# Patient Record
Sex: FEMALE | Race: WHITE | Employment: FULL TIME | URBAN - METROPOLITAN AREA
[De-identification: names, ages, dates, MRNs, and addresses within clinical notes are randomized per-mention and may not be internally consistent; named-entity substitution may affect disease eponyms.]

---

## 2017-02-17 ENCOUNTER — OFFICE VISIT (OUTPATIENT)
Dept: FAMILY MEDICINE CLINIC | Age: 47
End: 2017-02-17

## 2017-02-17 VITALS
SYSTOLIC BLOOD PRESSURE: 126 MMHG | TEMPERATURE: 98.2 F | BODY MASS INDEX: 38.09 KG/M2 | WEIGHT: 237 LBS | HEART RATE: 96 BPM | DIASTOLIC BLOOD PRESSURE: 70 MMHG | OXYGEN SATURATION: 98 % | HEIGHT: 66 IN

## 2017-02-17 DIAGNOSIS — R05.9 COUGH: ICD-10-CM

## 2017-02-17 DIAGNOSIS — J40 BRONCHITIS: Primary | ICD-10-CM

## 2017-02-17 DIAGNOSIS — J45.21 MILD INTERMITTENT ASTHMA WITH ACUTE EXACERBATION: ICD-10-CM

## 2017-02-17 DIAGNOSIS — M54.2 CERVICALGIA: ICD-10-CM

## 2017-02-17 DIAGNOSIS — F32.0 MILD SINGLE CURRENT EPISODE OF MAJOR DEPRESSIVE DISORDER (HCC): ICD-10-CM

## 2017-02-17 LAB
INFLUENZA A ANTIBODY: NORMAL
INFLUENZA B ANTIBODY: NORMAL

## 2017-02-17 PROCEDURE — 87804 INFLUENZA ASSAY W/OPTIC: CPT | Performed by: INTERNAL MEDICINE

## 2017-02-17 PROCEDURE — 99213 OFFICE O/P EST LOW 20 MIN: CPT | Performed by: INTERNAL MEDICINE

## 2017-02-17 RX ORDER — ESCITALOPRAM OXALATE 10 MG/1
10 TABLET ORAL NIGHTLY
Qty: 30 TABLET | Refills: 2 | Status: SHIPPED | OUTPATIENT
Start: 2017-02-17 | End: 2018-06-01

## 2017-02-17 RX ORDER — OSELTAMIVIR PHOSPHATE 75 MG/1
75 CAPSULE ORAL 2 TIMES DAILY
Qty: 10 CAPSULE | Refills: 0 | Status: SHIPPED | OUTPATIENT
Start: 2017-02-17 | End: 2017-02-22

## 2017-02-17 RX ORDER — ALBUTEROL SULFATE 90 UG/1
2 AEROSOL, METERED RESPIRATORY (INHALATION) EVERY 6 HOURS PRN
Qty: 1 INHALER | Refills: 3 | Status: SHIPPED | OUTPATIENT
Start: 2017-02-17

## 2017-02-17 RX ORDER — PREDNISONE 20 MG/1
TABLET ORAL
Qty: 10 TABLET | Refills: 0 | Status: SHIPPED | OUTPATIENT
Start: 2017-02-17 | End: 2018-05-21 | Stop reason: ALTCHOICE

## 2017-02-17 ASSESSMENT — ENCOUNTER SYMPTOMS
DIARRHEA: 0
SHORTNESS OF BREATH: 0
NAUSEA: 0
EYE ITCHING: 0
EYE PAIN: 0
CHEST TIGHTNESS: 0
SORE THROAT: 0
SINUS PRESSURE: 0
RHINORRHEA: 0
WHEEZING: 0
VOMITING: 0
CONSTIPATION: 0
EYE REDNESS: 0
COUGH: 1

## 2017-02-20 ENCOUNTER — TELEPHONE (OUTPATIENT)
Dept: FAMILY MEDICINE CLINIC | Age: 47
End: 2017-02-20

## 2017-02-20 DIAGNOSIS — J45.21 MILD INTERMITTENT ASTHMA WITH ACUTE EXACERBATION: Primary | ICD-10-CM

## 2017-02-20 RX ORDER — ALBUTEROL SULFATE 0.63 MG/3ML
1 SOLUTION RESPIRATORY (INHALATION) EVERY 6 HOURS PRN
Qty: 270 ML | Refills: 3 | Status: SHIPPED | OUTPATIENT
Start: 2017-02-20

## 2018-05-21 ENCOUNTER — OFFICE VISIT (OUTPATIENT)
Dept: INTERNAL MEDICINE CLINIC | Age: 48
End: 2018-05-21

## 2018-05-21 VITALS
WEIGHT: 242.4 LBS | SYSTOLIC BLOOD PRESSURE: 124 MMHG | DIASTOLIC BLOOD PRESSURE: 84 MMHG | BODY MASS INDEX: 40.39 KG/M2 | HEART RATE: 80 BPM | HEIGHT: 65 IN | OXYGEN SATURATION: 98 %

## 2018-05-21 DIAGNOSIS — F33.9 RECURRENT MAJOR DEPRESSIVE DISORDER, REMISSION STATUS UNSPECIFIED (HCC): ICD-10-CM

## 2018-05-21 DIAGNOSIS — D17.0 LIPOMA OF NECK: ICD-10-CM

## 2018-05-21 DIAGNOSIS — G56.01 CARPAL TUNNEL SYNDROME OF RIGHT WRIST: ICD-10-CM

## 2018-05-21 DIAGNOSIS — Z13.31 POSITIVE DEPRESSION SCREENING: ICD-10-CM

## 2018-05-21 DIAGNOSIS — Z00.00 PREVENTATIVE HEALTH CARE: ICD-10-CM

## 2018-05-21 DIAGNOSIS — M25.561 RIGHT ANTERIOR KNEE PAIN: ICD-10-CM

## 2018-05-21 DIAGNOSIS — Z00.00 PREVENTATIVE HEALTH CARE: Primary | ICD-10-CM

## 2018-05-21 LAB
A/G RATIO: 1.5 (ref 1.1–2.2)
ALBUMIN SERPL-MCNC: 4.4 G/DL (ref 3.4–5)
ALP BLD-CCNC: 61 U/L (ref 40–129)
ALT SERPL-CCNC: 22 U/L (ref 10–40)
ANION GAP SERPL CALCULATED.3IONS-SCNC: 14 MMOL/L (ref 3–16)
AST SERPL-CCNC: 17 U/L (ref 15–37)
BASOPHILS ABSOLUTE: 0 K/UL (ref 0–0.2)
BASOPHILS RELATIVE PERCENT: 0.9 %
BILIRUB SERPL-MCNC: 0.5 MG/DL (ref 0–1)
BUN BLDV-MCNC: 14 MG/DL (ref 7–20)
CALCIUM SERPL-MCNC: 9.1 MG/DL (ref 8.3–10.6)
CHLORIDE BLD-SCNC: 98 MMOL/L (ref 99–110)
CHOLESTEROL, TOTAL: 178 MG/DL (ref 0–199)
CO2: 27 MMOL/L (ref 21–32)
CREAT SERPL-MCNC: 0.6 MG/DL (ref 0.6–1.1)
EOSINOPHILS ABSOLUTE: 0.2 K/UL (ref 0–0.6)
EOSINOPHILS RELATIVE PERCENT: 4 %
GFR AFRICAN AMERICAN: >60
GFR NON-AFRICAN AMERICAN: >60
GLOBULIN: 2.9 G/DL
GLUCOSE BLD-MCNC: 98 MG/DL (ref 70–99)
HCT VFR BLD CALC: 39 % (ref 36–48)
HDLC SERPL-MCNC: 48 MG/DL (ref 40–60)
HEMOGLOBIN: 13.6 G/DL (ref 12–16)
LDL CHOLESTEROL CALCULATED: 105 MG/DL
LYMPHOCYTES ABSOLUTE: 1 K/UL (ref 1–5.1)
LYMPHOCYTES RELATIVE PERCENT: 22.4 %
MCH RBC QN AUTO: 31.8 PG (ref 26–34)
MCHC RBC AUTO-ENTMCNC: 34.9 G/DL (ref 31–36)
MCV RBC AUTO: 91.1 FL (ref 80–100)
MONOCYTES ABSOLUTE: 0.5 K/UL (ref 0–1.3)
MONOCYTES RELATIVE PERCENT: 11.7 %
NEUTROPHILS ABSOLUTE: 2.8 K/UL (ref 1.7–7.7)
NEUTROPHILS RELATIVE PERCENT: 61 %
PDW BLD-RTO: 13.9 % (ref 12.4–15.4)
PLATELET # BLD: 208 K/UL (ref 135–450)
PMV BLD AUTO: 9.3 FL (ref 5–10.5)
POTASSIUM SERPL-SCNC: 4.4 MMOL/L (ref 3.5–5.1)
RBC # BLD: 4.28 M/UL (ref 4–5.2)
SODIUM BLD-SCNC: 139 MMOL/L (ref 136–145)
TOTAL PROTEIN: 7.3 G/DL (ref 6.4–8.2)
TRIGL SERPL-MCNC: 127 MG/DL (ref 0–150)
TSH SERPL DL<=0.05 MIU/L-ACNC: 1.68 UIU/ML (ref 0.27–4.2)
VLDLC SERPL CALC-MCNC: 25 MG/DL
WBC # BLD: 4.6 K/UL (ref 4–11)

## 2018-05-21 PROCEDURE — G8431 POS CLIN DEPRES SCRN F/U DOC: HCPCS | Performed by: NURSE PRACTITIONER

## 2018-05-21 PROCEDURE — 99214 OFFICE O/P EST MOD 30 MIN: CPT | Performed by: NURSE PRACTITIONER

## 2018-05-21 PROCEDURE — G0444 DEPRESSION SCREEN ANNUAL: HCPCS | Performed by: NURSE PRACTITIONER

## 2018-05-21 RX ORDER — DULOXETIN HYDROCHLORIDE 60 MG/1
CAPSULE, DELAYED RELEASE ORAL
Qty: 180 CAPSULE | Refills: 0 | Status: SHIPPED | OUTPATIENT
Start: 2018-05-21 | End: 2018-06-19 | Stop reason: SDUPTHER

## 2018-05-21 ASSESSMENT — PATIENT HEALTH QUESTIONNAIRE - PHQ9
SUM OF ALL RESPONSES TO PHQ QUESTIONS 1-9: 7
5. POOR APPETITE OR OVEREATING: 0
7. TROUBLE CONCENTRATING ON THINGS, SUCH AS READING THE NEWSPAPER OR WATCHING TELEVISION: 1
8. MOVING OR SPEAKING SO SLOWLY THAT OTHER PEOPLE COULD HAVE NOTICED. OR THE OPPOSITE, BEING SO FIGETY OR RESTLESS THAT YOU HAVE BEEN MOVING AROUND A LOT MORE THAN USUAL: 0
4. FEELING TIRED OR HAVING LITTLE ENERGY: 3
1. LITTLE INTEREST OR PLEASURE IN DOING THINGS: 3
3. TROUBLE FALLING OR STAYING ASLEEP: 0
9. THOUGHTS THAT YOU WOULD BE BETTER OFF DEAD, OR OF HURTING YOURSELF: 0
6. FEELING BAD ABOUT YOURSELF - OR THAT YOU ARE A FAILURE OR HAVE LET YOURSELF OR YOUR FAMILY DOWN: 0
SUM OF ALL RESPONSES TO PHQ9 QUESTIONS 1 & 2: 3
10. IF YOU CHECKED OFF ANY PROBLEMS, HOW DIFFICULT HAVE THESE PROBLEMS MADE IT FOR YOU TO DO YOUR WORK, TAKE CARE OF THINGS AT HOME, OR GET ALONG WITH OTHER PEOPLE: 1
2. FEELING DOWN, DEPRESSED OR HOPELESS: 0

## 2018-05-22 LAB
ESTIMATED AVERAGE GLUCOSE: 125.5 MG/DL
HBA1C MFR BLD: 6 %

## 2018-05-23 ENCOUNTER — OFFICE VISIT (OUTPATIENT)
Dept: BARIATRICS/WEIGHT MGMT | Age: 48
End: 2018-05-23

## 2018-05-23 VITALS
BODY MASS INDEX: 40.08 KG/M2 | DIASTOLIC BLOOD PRESSURE: 76 MMHG | HEART RATE: 80 BPM | HEIGHT: 65 IN | SYSTOLIC BLOOD PRESSURE: 122 MMHG | WEIGHT: 240.6 LBS

## 2018-05-23 DIAGNOSIS — D17.0 LIPOMA OF NECK: Primary | ICD-10-CM

## 2018-05-23 PROCEDURE — 99204 OFFICE O/P NEW MOD 45 MIN: CPT | Performed by: SURGERY

## 2018-05-23 ASSESSMENT — ENCOUNTER SYMPTOMS
NAUSEA: 0
COUGH: 0
WHEEZING: 0
BLOOD IN STOOL: 0
DIARRHEA: 0
CONSTIPATION: 0
VOMITING: 0
ANAL BLEEDING: 0
TROUBLE SWALLOWING: 0
SORE THROAT: 0
PHOTOPHOBIA: 0
ABDOMINAL PAIN: 0
SHORTNESS OF BREATH: 0
COLOR CHANGE: 0
VOICE CHANGE: 0

## 2018-05-29 ENCOUNTER — TELEPHONE (OUTPATIENT)
Dept: BARIATRICS/WEIGHT MGMT | Age: 48
End: 2018-05-29

## 2018-06-13 ENCOUNTER — OFFICE VISIT (OUTPATIENT)
Dept: BARIATRICS/WEIGHT MGMT | Age: 48
End: 2018-06-13

## 2018-06-13 VITALS
WEIGHT: 240.9 LBS | HEIGHT: 65 IN | BODY MASS INDEX: 40.14 KG/M2 | SYSTOLIC BLOOD PRESSURE: 188 MMHG | DIASTOLIC BLOOD PRESSURE: 98 MMHG | HEART RATE: 95 BPM

## 2018-06-13 DIAGNOSIS — D17.0 LIPOMA OF NECK: Primary | ICD-10-CM

## 2018-06-13 PROCEDURE — 99024 POSTOP FOLLOW-UP VISIT: CPT | Performed by: SURGERY

## 2018-06-13 ASSESSMENT — ENCOUNTER SYMPTOMS
BLOOD IN STOOL: 0
WHEEZING: 0
CONSTIPATION: 0
ANAL BLEEDING: 0
VOICE CHANGE: 0
DIARRHEA: 0
VOMITING: 0
SORE THROAT: 0
SHORTNESS OF BREATH: 0
ABDOMINAL PAIN: 0
COUGH: 0
TROUBLE SWALLOWING: 0
PHOTOPHOBIA: 0
NAUSEA: 0
COLOR CHANGE: 0

## 2018-06-19 DIAGNOSIS — F33.9 RECURRENT MAJOR DEPRESSIVE DISORDER, REMISSION STATUS UNSPECIFIED (HCC): ICD-10-CM

## 2018-06-19 RX ORDER — DULOXETIN HYDROCHLORIDE 60 MG/1
CAPSULE, DELAYED RELEASE ORAL
Qty: 180 CAPSULE | Refills: 0 | Status: SHIPPED | OUTPATIENT
Start: 2018-06-19

## 2018-06-27 RX ORDER — OXYCODONE HYDROCHLORIDE AND ACETAMINOPHEN 5; 325 MG/1; MG/1
1-2 TABLET ORAL EVERY 6 HOURS PRN
Qty: 35 TABLET | Refills: 0 | Status: SHIPPED | OUTPATIENT
Start: 2018-06-27 | End: 2018-07-04

## 2018-07-13 ENCOUNTER — OFFICE VISIT (OUTPATIENT)
Dept: BARIATRICS/WEIGHT MGMT | Age: 48
End: 2018-07-13

## 2018-07-13 VITALS
SYSTOLIC BLOOD PRESSURE: 134 MMHG | HEIGHT: 65 IN | DIASTOLIC BLOOD PRESSURE: 83 MMHG | HEART RATE: 69 BPM | RESPIRATION RATE: 17 BRPM | WEIGHT: 231.4 LBS | BODY MASS INDEX: 38.55 KG/M2

## 2018-07-13 DIAGNOSIS — D17.21 LIPOMA OF RIGHT UPPER EXTREMITY: Primary | ICD-10-CM

## 2018-07-13 PROCEDURE — 99024 POSTOP FOLLOW-UP VISIT: CPT | Performed by: SURGERY

## 2018-07-13 NOTE — PROGRESS NOTES
GENERAL SURGERY OFFICE NOTE    SUBJECTIVE:    Patient presenting today referred from Maxim Miranda, APRN - CNP and No ref. provider found, for   Chief Complaint   Patient presents with    Follow-up     PO#2 Neck Excision       HPI: Josetta Spatz is a 56 y.o. female presenting in second, follow up 1 month post op 6/4/18. Procedure:   Excision of the right neck lipoma with  supraclavicular lymph node dissection. Pathology:   Final Pathologic Diagnosis:  Soft tissue, right neck, excision:  -  Lipoma. -  Focal benign reactive lymph node is noted. WOUND VERY NICELY HEALING, numbness improving, and no infection. Wounds very nicely healing, no erythema, no induration, no purulent discharge. No constipation, BMs back to normal.    Thoroughly reviewed the patient's medical history, family history, social history and review of systems with the patient today in the office. Please see medical record for pertinent positives.       Past Medical History:   Diagnosis Date    Anxiety     [facility]- usually uses once a year    Depression     pt reports did well on cymbalta in the past    History of basal cell cancer     \"removed left arm- 2013    Seasonal allergies     mostly spring and fall sx    Weight gain       Past Surgical History:   Procedure Laterality Date    ARM SURGERY Left 2013    basal cell removal    WISDOM TOOTH EXTRACTION  age 24     Current Outpatient Prescriptions   Medication Sig Dispense Refill    DULoxetine (CYMBALTA) 60 MG extended release capsule TAKE 2 CAPSULES BY MOUTH NIGHTLY 180 capsule 0    albuterol (ACCUNEB) 0.63 MG/3ML nebulizer solution Take 3 mLs by nebulization every 6 hours as needed for Wheezing 270 mL 3    albuterol sulfate HFA (PROAIR HFA) 108 (90 BASE) MCG/ACT inhaler Inhale 2 puffs into the lungs every 6 hours as needed for Wheezing 1 Inhaler 3    Multiple Vitamins-Minerals (THERAPEUTIC MULTIVITAMIN-MINERALS) tablet Take 1 tablet by mouth daily       No current facility-administered medications for this visit. No Known Allergies        Review of Systems      OBJECTIVE:    /83 (Site: Left Arm, Position: Sitting, Cuff Size: Medium Adult)   Pulse 69   Resp 17   Ht 5' 4.5\" (1.638 m)   Wt 231 lb 6.4 oz (105 kg)   LMP 07/12/2018 (Exact Date)   BMI 39.11 kg/m²      Physical Exam      ASSESSMENT & PLAN:    1. Lipoma of right upper extremity           WOUND VERY NICELY HEALING, numbness improving, and no infection. Patient counseled on the risks, benefits, and alternatives of treatment plan at length while in the office today. Patient states an understanding and willingness to proceed with the plan. Follow Up:  Return for PRN - As needed for any new symptom. Scarlett Lopez MD, FACS, FICS. 7/13/18       Patient was seen with total face to face time of 25 minutes. More than 50% of this visit was counseling and education as above in my assessment and plan.

## 2019-04-22 ENCOUNTER — HOSPITAL ENCOUNTER (OUTPATIENT)
Dept: WOMENS IMAGING | Age: 49
Discharge: HOME | End: 2019-04-22
Payer: COMMERCIAL

## 2019-04-22 DIAGNOSIS — Z12.31 ENCOUNTER FOR SCREENING MAMMOGRAM FOR BREAST CANCER: ICD-10-CM

## 2019-04-22 PROCEDURE — 77067 SCR MAMMO BI INCL CAD: CPT

## 2022-02-17 ENCOUNTER — HOSPITAL ENCOUNTER (OUTPATIENT)
Dept: MRI IMAGING | Age: 52
Discharge: HOME | End: 2022-02-17
Payer: COMMERCIAL

## 2022-02-17 DIAGNOSIS — M54.12 RADICULOPATHY, CERVICAL: ICD-10-CM

## 2022-02-17 PROCEDURE — 72141 MRI NECK SPINE W/O DYE: CPT

## 2024-07-31 ENCOUNTER — APPOINTMENT (OUTPATIENT)
Dept: URBAN - METROPOLITAN AREA CLINIC 187 | Age: 54
Setting detail: DERMATOLOGY
End: 2024-07-31

## 2024-07-31 DIAGNOSIS — L57.0 ACTINIC KERATOSIS: ICD-10-CM

## 2024-07-31 DIAGNOSIS — L82.1 OTHER SEBORRHEIC KERATOSIS: ICD-10-CM

## 2024-07-31 PROBLEM — D23.39 OTHER BENIGN NEOPLASM OF SKIN OF OTHER PARTS OF FACE: Status: ACTIVE | Noted: 2024-07-31

## 2024-07-31 PROCEDURE — OTHER COUNSELING: OTHER

## 2024-07-31 PROCEDURE — 99202 OFFICE O/P NEW SF 15 MIN: CPT | Mod: 25

## 2024-07-31 PROCEDURE — OTHER MIPS QUALITY: OTHER

## 2024-07-31 PROCEDURE — 17003 DESTRUCT PREMALG LES 2-14: CPT

## 2024-07-31 PROCEDURE — 17000 DESTRUCT PREMALG LESION: CPT

## 2024-07-31 PROCEDURE — OTHER LIQUID NITROGEN: OTHER

## 2024-07-31 ASSESSMENT — LOCATION ZONE DERM: LOCATION ZONE: FACE

## 2024-07-31 ASSESSMENT — LOCATION DETAILED DESCRIPTION DERM
LOCATION DETAILED: RIGHT INFERIOR CENTRAL MALAR CHEEK
LOCATION DETAILED: LEFT INFERIOR CENTRAL MALAR CHEEK
LOCATION DETAILED: LEFT CENTRAL MALAR CHEEK
LOCATION DETAILED: RIGHT MID TEMPLE

## 2024-07-31 ASSESSMENT — LOCATION SIMPLE DESCRIPTION DERM
LOCATION SIMPLE: RIGHT TEMPLE
LOCATION SIMPLE: LEFT CHEEK
LOCATION SIMPLE: RIGHT CHEEK

## 2024-07-31 NOTE — PROCEDURE: LIQUID NITROGEN
Post-Care Instructions: I reviewed with the patient in detail post-care instructions. Patient is to wear sunprotection, and avoid picking at any of the treated lesions. Pt may apply Vaseline to crusted or scabbing areas.
Detail Level: Zone
Consent: The patient's consent was obtained including but not limited to risks of crusting, scabbing, blistering, scarring, darker or lighter pigmentary change, recurrence, incomplete removal and infection.
Render Post-Care Instructions In Note?: no
Duration Of Freeze Thaw-Cycle (Seconds): 0
Total Number Of Aks Treated: 2